# Patient Record
Sex: FEMALE | Race: WHITE | NOT HISPANIC OR LATINO | Employment: FULL TIME | RURAL
[De-identification: names, ages, dates, MRNs, and addresses within clinical notes are randomized per-mention and may not be internally consistent; named-entity substitution may affect disease eponyms.]

---

## 2022-09-13 ENCOUNTER — HOSPITAL ENCOUNTER (EMERGENCY)
Facility: HOSPITAL | Age: 32
Discharge: HOME | End: 2022-09-13
Attending: FAMILY MEDICINE
Payer: COMMERCIAL

## 2022-09-13 VITALS
OXYGEN SATURATION: 99 % | SYSTOLIC BLOOD PRESSURE: 121 MMHG | HEIGHT: 69 IN | BODY MASS INDEX: 20.73 KG/M2 | TEMPERATURE: 98 F | RESPIRATION RATE: 16 BRPM | HEART RATE: 65 BPM | WEIGHT: 140 LBS | DIASTOLIC BLOOD PRESSURE: 82 MMHG

## 2022-09-13 DIAGNOSIS — N20.0 RIGHT RENAL STONE: Primary | ICD-10-CM

## 2022-09-13 PROCEDURE — 99284 EMERGENCY DEPT VISIT MOD MDM: CPT | Mod: 25

## 2022-09-13 PROCEDURE — 99283 PR EMERGENCY DEPT VISIT,LEVEL III: ICD-10-PCS | Mod: ,,, | Performed by: FAMILY MEDICINE

## 2022-09-13 PROCEDURE — 99283 EMERGENCY DEPT VISIT LOW MDM: CPT | Mod: ,,, | Performed by: FAMILY MEDICINE

## 2022-09-13 RX ORDER — NAPROXEN 500 MG/1
500 TABLET ORAL 2 TIMES DAILY WITH MEALS
Qty: 60 TABLET | Refills: 0 | Status: SHIPPED | OUTPATIENT
Start: 2022-09-13

## 2022-09-13 RX ORDER — TIZANIDINE 4 MG/1
4 TABLET ORAL EVERY 6 HOURS PRN
Qty: 30 TABLET | Refills: 0 | Status: SHIPPED | OUTPATIENT
Start: 2022-09-13

## 2022-09-13 RX ORDER — TIZANIDINE 4 MG/1
4 TABLET ORAL EVERY 6 HOURS PRN
Qty: 30 TABLET | Refills: 0 | OUTPATIENT
Start: 2022-09-13 | End: 2022-09-13 | Stop reason: SDUPTHER

## 2022-09-13 RX ORDER — NITROFURANTOIN 25; 75 MG/1; MG/1
100 CAPSULE ORAL 2 TIMES DAILY
Qty: 10 CAPSULE | Refills: 0 | Status: SHIPPED | OUTPATIENT
Start: 2022-09-13 | End: 2022-09-18

## 2022-09-13 RX ORDER — NAPROXEN 500 MG/1
500 TABLET ORAL 2 TIMES DAILY WITH MEALS
Qty: 60 TABLET | Refills: 0 | OUTPATIENT
Start: 2022-09-13 | End: 2022-09-13 | Stop reason: SDUPTHER

## 2022-09-13 NOTE — Clinical Note
"Maki"Alexx Chun was seen and treated in our emergency department on 9/13/2022.  She may return to work on 09/15/2022.       If you have any questions or concerns, please don't hesitate to call.      Micah Yoo, DO"

## 2022-09-13 NOTE — ED TRIAGE NOTES
Started 1 day PTA with right sided back pain that progressed to right sided abd pain with some nausea and vomiting from the pain, reports pressure with urine, no burning, no fowl odor, no blood

## 2022-11-22 NOTE — ED PROVIDER NOTES
Encounter Date: 9/13/2022       History     Chief Complaint   Patient presents with    Abdominal Pain    Back Pain     Right side abd and back pain for 24 hours     Patient in with abdominal pain radiates to the back ongoing now 24 hours.      Review of patient's allergies indicates:   Allergen Reactions    Ondansetron Anaphylaxis     History reviewed. No pertinent past medical history.  Past Surgical History:   Procedure Laterality Date    APPENDECTOMY      KIDNEY SURGERY      stents    OVARIAN CYST SURGERY       History reviewed. No pertinent family history.  Social History     Tobacco Use    Smoking status: Every Day     Packs/day: 0.25     Types: Cigarettes, Vaping with nicotine    Smokeless tobacco: Never   Substance Use Topics    Alcohol use: Yes     Comment: rarely    Drug use: Never     Review of Systems   Constitutional: Negative.  Negative for fever.   HENT: Negative.  Negative for sore throat.    Eyes: Negative.    Respiratory: Negative.  Negative for shortness of breath.    Cardiovascular: Negative.  Negative for chest pain.   Gastrointestinal: Negative.  Negative for nausea.   Endocrine: Negative.    Genitourinary: Negative.  Negative for dysuria.   Musculoskeletal: Negative.  Negative for back pain.   Skin: Negative.  Negative for rash.   Allergic/Immunologic: Negative.    Neurological: Negative.  Negative for weakness.   Hematological: Negative.  Does not bruise/bleed easily.   Psychiatric/Behavioral: Negative.     All other systems reviewed and are negative.    Physical Exam     Initial Vitals [09/13/22 1310]   BP Pulse Resp Temp SpO2   (!) 143/95 72 18 98.1 °F (36.7 °C) 100 %      MAP       --         Physical Exam    Constitutional: She appears well-developed and well-nourished.   HENT:   Head: Normocephalic and atraumatic.   Right Ear: External ear normal.   Left Ear: External ear normal.   Nose: Nose normal.   Mouth/Throat: Oropharynx is clear and moist.   Eyes: Conjunctivae and EOM are normal.  Pupils are equal, round, and reactive to light.   Neck: Neck supple.   Normal range of motion.  Cardiovascular:  Normal rate, regular rhythm, normal heart sounds and intact distal pulses.           Pulmonary/Chest: Breath sounds normal.   Abdominal: Abdomen is soft. Bowel sounds are normal.   Genitourinary:    Vagina and uterus normal.     Musculoskeletal:         General: Normal range of motion.      Cervical back: Normal range of motion and neck supple.     Neurological: She is alert and oriented to person, place, and time. She has normal strength and normal reflexes.   Skin: Skin is warm. Capillary refill takes less than 2 seconds.   Psychiatric: She has a normal mood and affect. Her behavior is normal. Judgment and thought content normal.       Medical Screening Exam   See Full Note    ED Course   Procedures  Labs Reviewed - No data to display       Imaging Results              CT Abdomen Pelvis  Without Contrast (Final result)  Result time 09/13/22 14:14:28      Final result by Johnathon Sterling DO (09/13/22 14:14:28)                   Impression:      No convincing acute CT findings.  Prior appendectomy, a few punctate nonobstructing right renal calculi, and other detailed findings as above.    The CT exam was performed using one or more of the following dose    reduction techniques- Automated exposure control, adjustment of the mA    and/or kV according to patient size, and/or use of iterative    reconstructed technique.    Point of Service: [facility]      Electronically signed by: Johnathon Sterling  Date:    09/13/2022  Time:    14:14               Narrative:    EXAMINATION:  CT ABDOMEN PELVIS WITHOUT CONTRAST    CLINICAL HISTORY:  Abdominal pain, acute, nonlocalized;    COMPARISON:  CT abdomen pelvis December 11, 2017    TECHNIQUE:  Multiple axial tomographic images of the abdomen and pelvis were obtained without the use of intravenous contrast.    FINDINGS:  Lung bases clear.    No worrisome focal  hepatic abnormality demonstrated on submitted images.  Visualized gallbladder grossly unremarkable.  Visualized pancreas appears unremarkable.  Spleen grossly unremarkable.    Bilateral adrenal glands grossly unremarkable.  A few punctate nonobstructing right renal calculi noted inferiorly.  No convincing ureteral calculus or hydronephrosis.  Urinary bladder incompletely distended.  Tampon suggested within the vagina.    No convincing evidence of gastrointestinal obstruction or acute appendicitis.  Suspect prior appendectomy.  Vasculature grossly unremarkable.  Visualized osseous and surrounding soft tissue structures demonstrate no acute abnormality.                                       Medications - No data to display                    Clinical Impression:   Final diagnoses:  [N20.0] Right renal stone (Primary)        ED Disposition Condition    Discharge Stable          ED Prescriptions       Medication Sig Dispense Start Date End Date Auth. Provider    naproxen (NAPROSYN) 500 MG tablet  (Status: Discontinued) Take 1 tablet (500 mg total) by mouth 2 (two) times daily with meals. 60 tablet 2022 Micah Yoo, DO    tiZANidine (ZANAFLEX) 4 MG tablet  (Status: Discontinued) Take 1 tablet (4 mg total) by mouth every 6 (six) hours as needed. 30 tablet 2022 Micah Yoo DO    naproxen (NAPROSYN) 500 MG tablet Take 1 tablet (500 mg total) by mouth 2 (two) times daily with meals. 60 tablet 2022 -- Micah Yoo DO    tiZANidine (ZANAFLEX) 4 MG tablet Take 1 tablet (4 mg total) by mouth every 6 (six) hours as needed. 30 tablet 2022 -- Micah Yoo DO    nitrofurantoin, macrocrystal-monohydrate, (MACROBID) 100 MG capsule () Take 1 capsule (100 mg total) by mouth 2 (two) times daily. for 5 days 10 capsule 2022 Micah Yoo DO          Follow-up Information    None          Micah Yoo DO  22 [id number]

## 2023-04-09 ENCOUNTER — HOSPITAL ENCOUNTER (EMERGENCY)
Facility: HOSPITAL | Age: 33
Discharge: HOME | End: 2023-04-09
Payer: COMMERCIAL

## 2023-04-09 VITALS
BODY MASS INDEX: 25.03 KG/M2 | HEIGHT: 69 IN | RESPIRATION RATE: 16 BRPM | WEIGHT: 169 LBS | SYSTOLIC BLOOD PRESSURE: 126 MMHG | HEART RATE: 69 BPM | TEMPERATURE: 98 F | OXYGEN SATURATION: 100 % | DIASTOLIC BLOOD PRESSURE: 85 MMHG

## 2023-04-09 DIAGNOSIS — L25.9 CONTACT DERMATITIS, UNSPECIFIED CONTACT DERMATITIS TYPE, UNSPECIFIED TRIGGER: Primary | ICD-10-CM

## 2023-04-09 PROCEDURE — 99283 PR EMERGENCY DEPT VISIT,LEVEL III: ICD-10-PCS | Mod: ,,, | Performed by: PHYSICIAN ASSISTANT

## 2023-04-09 PROCEDURE — 96372 THER/PROPH/DIAG INJ SC/IM: CPT | Performed by: PHYSICIAN ASSISTANT

## 2023-04-09 PROCEDURE — 99284 EMERGENCY DEPT VISIT MOD MDM: CPT

## 2023-04-09 PROCEDURE — 63600175 PHARM REV CODE 636 W HCPCS: Performed by: PHYSICIAN ASSISTANT

## 2023-04-09 PROCEDURE — 99283 EMERGENCY DEPT VISIT LOW MDM: CPT | Mod: ,,, | Performed by: PHYSICIAN ASSISTANT

## 2023-04-09 RX ORDER — METHYLPREDNISOLONE SOD SUCC 125 MG
125 VIAL (EA) INJECTION
Status: COMPLETED | OUTPATIENT
Start: 2023-04-09 | End: 2023-04-09

## 2023-04-09 RX ORDER — PREDNISONE 50 MG/1
50 TABLET ORAL DAILY
Qty: 5 TABLET | Refills: 0 | Status: SHIPPED | OUTPATIENT
Start: 2023-04-09 | End: 2023-04-09 | Stop reason: SDUPTHER

## 2023-04-09 RX ORDER — PREDNISONE 50 MG/1
50 TABLET ORAL DAILY
Qty: 5 TABLET | Refills: 0 | Status: SHIPPED | OUTPATIENT
Start: 2023-04-09

## 2023-04-09 RX ADMIN — METHYLPREDNISOLONE SODIUM SUCCINATE 125 MG: 125 INJECTION, POWDER, FOR SOLUTION INTRAMUSCULAR; INTRAVENOUS at 07:04

## 2023-04-10 NOTE — ED NOTES
Instructed patient to keep wound clean & dry.  May apply hydrocortisone cream & antibiotic cream if desired.  Pickup prescription from pharmacy on Monday & take ad directed.  Do not cover the wound, leave open to air.  Return to ed for any new or worsening symptoms.  Follow-up with a PCP if not improving in 2-3 days. Avoid scratching the wound.  May apply cool compresses to help with itching.  Patient verbalized understanding of all instructions.

## 2023-04-10 NOTE — ED PROVIDER NOTES
Encounter Date: 4/9/2023       History     Chief Complaint   Patient presents with    Insect Bite     Patient was bitten by something while mowing the grass a week ago on right upper arm.     Patient is a 33-year-old female with history right upper arm skin irritation on the anterior aspect just above her elbow, it is urticaria and itching.    She has no significant past medical history.    Past surgical history is positive for kidney surgery, appendectomy, and ovarian cyst surgery.    She is a daily smoker.    Drinks socially, and denies any recreational drug use      Review of patient's allergies indicates:   Allergen Reactions    Ondansetron Anaphylaxis     History reviewed. No pertinent past medical history.  Past Surgical History:   Procedure Laterality Date    APPENDECTOMY      KIDNEY SURGERY      stents    OVARIAN CYST SURGERY       History reviewed. No pertinent family history.  Social History     Tobacco Use    Smoking status: Every Day     Packs/day: 0.25     Types: Cigarettes, Vaping with nicotine    Smokeless tobacco: Never   Substance Use Topics    Alcohol use: Yes     Comment: rarely    Drug use: Never     Review of Systems   Skin:         2 x 2 cm area urticaria with no real surrounding erythema.       All other systems reviewed and are negative.    Physical Exam     Initial Vitals [04/09/23 1925]   BP Pulse Resp Temp SpO2   (!) (P) 138/95 (P) 75 (P) 16 (P) 98.2 °F (36.8 °C) (P) 100 %      MAP       --         Physical Exam    Nursing note and vitals reviewed.  Constitutional: She appears well-nourished. No distress.   HENT:   Head: Atraumatic.   Eyes: EOM are normal.   Neck: Neck supple.   Cardiovascular:  Normal rate and regular rhythm.           No murmur heard.  Pulmonary/Chest: No respiratory distress.   Musculoskeletal:      Cervical back: Neck supple.     Neurological: She is alert and oriented to person, place, and time.   Skin:   2 x 2 cm area urticaria with no real surrounding erythema.      Psychiatric: She has a normal mood and affect.       Medical Screening Exam   See Full Note    ED Course   Procedures  Labs Reviewed - No data to display       Imaging Results    None          Medications   methylPREDNISolone sodium succinate injection 125 mg (has no administration in time range)     Medical Decision Making:   Initial Assessment:   Patient is a 33-year-old female with history right upper arm skin irritation on the anterior aspect just above her elbow, it is urticaria and itching.    She has no significant past medical history.    Past surgical history is positive for kidney surgery, appendectomy, and ovarian cyst surgery.    She is a daily smoker.    Drinks socially, and denies any recreational drug use  Differential Diagnosis:   Contact dermatitis  ED Management:  Patient got 125 mg of Solu-Medrol IM.      She will be given prednisone 50 mg daily for 5 days.    Symptoms do not resolve in 4-5 days, she will follow up with her primary care provider.                 Clinical Impression:   Final diagnoses:  [L25.9] Contact dermatitis, unspecified contact dermatitis type, unspecified trigger (Primary)        ED Disposition Condition    Discharge Stable          ED Prescriptions       Medication Sig Dispense Start Date End Date Auth. Provider    predniSONE (DELTASONE) 50 MG Tab Take 1 tablet (50 mg total) by mouth once daily. 5 tablet 4/9/2023 -- BILL Kilpatrick          Follow-up Information    None          BILL Kilpatrick  04/09/23 1937

## 2023-04-10 NOTE — ED NOTES
Patient presents with c/o possible spider bite to right upper arm.  There is a reddened area approximately 4 cm long & o.75 cm wide.  There are some small pustules about the wound.  Patient reported the area is very itchy & throbbing.  States it was draining earlier today. Rates pain at 2 of 10.  Respirations are even & unlabored.  Moves all extremities well.  Alert & oriented x 4.

## 2023-04-14 NOTE — ADDENDUM NOTE
Encounter addended by: Monalisa Pereira RN on: 4/14/2023 9:24 AM   Actions taken: Contacts section saved

## 2023-04-24 NOTE — ADDENDUM NOTE
Encounter addended by: Tiffany Zayas RN on: 4/24/2023 12:40 PM   Actions taken: Contacts section saved